# Patient Record
Sex: FEMALE | Race: OTHER | HISPANIC OR LATINO | URBAN - METROPOLITAN AREA
[De-identification: names, ages, dates, MRNs, and addresses within clinical notes are randomized per-mention and may not be internally consistent; named-entity substitution may affect disease eponyms.]

---

## 2019-01-10 ENCOUNTER — OUTPATIENT (OUTPATIENT)
Dept: OUTPATIENT SERVICES | Facility: HOSPITAL | Age: 24
LOS: 1 days | End: 2019-01-10
Payer: MEDICAID

## 2019-01-10 VITALS
TEMPERATURE: 98 F | RESPIRATION RATE: 18 BRPM | OXYGEN SATURATION: 100 % | SYSTOLIC BLOOD PRESSURE: 114 MMHG | HEIGHT: 66 IN | DIASTOLIC BLOOD PRESSURE: 72 MMHG | WEIGHT: 164.91 LBS | HEART RATE: 70 BPM

## 2019-01-10 DIAGNOSIS — N62 HYPERTROPHY OF BREAST: ICD-10-CM

## 2019-01-10 DIAGNOSIS — M53.82 OTHER SPECIFIED DORSOPATHIES, CERVICAL REGION: ICD-10-CM

## 2019-01-10 DIAGNOSIS — M54.89 OTHER DORSALGIA: ICD-10-CM

## 2019-01-10 DIAGNOSIS — Z01.818 ENCOUNTER FOR OTHER PREPROCEDURAL EXAMINATION: ICD-10-CM

## 2019-01-10 DIAGNOSIS — L30.4 ERYTHEMA INTERTRIGO: ICD-10-CM

## 2019-01-10 LAB
ANION GAP SERPL CALC-SCNC: 11 MMOL/L — SIGNIFICANT CHANGE UP (ref 5–17)
APTT BLD: 29.5 SEC — SIGNIFICANT CHANGE UP (ref 27.5–36.3)
BUN SERPL-MCNC: 11 MG/DL — SIGNIFICANT CHANGE UP (ref 7–18)
CALCIUM SERPL-MCNC: 8.5 MG/DL — SIGNIFICANT CHANGE UP (ref 8.4–10.5)
CHLORIDE SERPL-SCNC: 102 MMOL/L — SIGNIFICANT CHANGE UP (ref 96–108)
CO2 SERPL-SCNC: 27 MMOL/L — SIGNIFICANT CHANGE UP (ref 22–31)
CREAT SERPL-MCNC: 0.6 MG/DL — SIGNIFICANT CHANGE UP (ref 0.5–1.3)
GLUCOSE SERPL-MCNC: 84 MG/DL — SIGNIFICANT CHANGE UP (ref 70–99)
HCT VFR BLD CALC: 38.3 % — SIGNIFICANT CHANGE UP (ref 34.5–45)
HGB BLD-MCNC: 12.6 G/DL — SIGNIFICANT CHANGE UP (ref 11.5–15.5)
INR BLD: 1.02 RATIO — SIGNIFICANT CHANGE UP (ref 0.88–1.16)
MCHC RBC-ENTMCNC: 30.4 PG — SIGNIFICANT CHANGE UP (ref 27–34)
MCHC RBC-ENTMCNC: 33.1 GM/DL — SIGNIFICANT CHANGE UP (ref 32–36)
MCV RBC AUTO: 92 FL — SIGNIFICANT CHANGE UP (ref 80–100)
PLATELET # BLD AUTO: 282 K/UL — SIGNIFICANT CHANGE UP (ref 150–400)
POTASSIUM SERPL-MCNC: 3.8 MMOL/L — SIGNIFICANT CHANGE UP (ref 3.5–5.3)
POTASSIUM SERPL-SCNC: 3.8 MMOL/L — SIGNIFICANT CHANGE UP (ref 3.5–5.3)
PROTHROM AB SERPL-ACNC: 11.3 SEC — SIGNIFICANT CHANGE UP (ref 10–12.9)
RBC # BLD: 4.16 M/UL — SIGNIFICANT CHANGE UP (ref 3.8–5.2)
RBC # FLD: 12 % — SIGNIFICANT CHANGE UP (ref 10.3–14.5)
SODIUM SERPL-SCNC: 140 MMOL/L — SIGNIFICANT CHANGE UP (ref 135–145)
WBC # BLD: 9.7 K/UL — SIGNIFICANT CHANGE UP (ref 3.8–10.5)
WBC # FLD AUTO: 9.7 K/UL — SIGNIFICANT CHANGE UP (ref 3.8–10.5)

## 2019-01-10 PROCEDURE — 80048 BASIC METABOLIC PNL TOTAL CA: CPT

## 2019-01-10 PROCEDURE — 85610 PROTHROMBIN TIME: CPT

## 2019-01-10 PROCEDURE — 36415 COLL VENOUS BLD VENIPUNCTURE: CPT

## 2019-01-10 PROCEDURE — 85730 THROMBOPLASTIN TIME PARTIAL: CPT

## 2019-01-10 PROCEDURE — 85027 COMPLETE CBC AUTOMATED: CPT

## 2019-01-10 PROCEDURE — G0463: CPT

## 2019-01-10 RX ORDER — SODIUM CHLORIDE 9 MG/ML
3 INJECTION INTRAMUSCULAR; INTRAVENOUS; SUBCUTANEOUS EVERY 8 HOURS
Qty: 0 | Refills: 0 | Status: DISCONTINUED | OUTPATIENT
Start: 2019-01-22 | End: 2019-01-30

## 2019-01-10 NOTE — H&P PST ADULT - HISTORY OF PRESENT ILLNESS
23year old female with pmhx of migraine headaches presents with c/o back pain related to hypertrophic breast. Patient takes Ibuprofen PRN for pain with mild relieve. Patient is here today for presurgical testing for scheduled on Bilateral reduction mammoplasty on 1/22/19 23year old female with pmhx of migraine headaches presents with c/o back pain related to hypertrophic breast. Patient takes Ibuprofen PRN for pain with mild relieve. Patient is here today for presurgical testing for scheduled on Bilateral reduction mammoplasty on 1/22/19. 5 feet 5 inches tall, 34DDD bra.

## 2019-01-10 NOTE — H&P PST ADULT - BREASTS COMMENTS
Erythema Intertigo/ Hypertrophy of breast/Dorsalgia/ Cervical dorsapathies Erythema Intertigo/ Hypertrophy of breast/Dorsalgia/ Cervical dorsapathies. No masses in either breast or axilla.  Nipple heights 28 cm on the right and 29.5 cm on the left.  Nipple to inframammary crease distances 14.5 cm on the right and 15 cm on the left.

## 2019-01-22 ENCOUNTER — OUTPATIENT (OUTPATIENT)
Dept: OUTPATIENT SERVICES | Facility: HOSPITAL | Age: 24
LOS: 1 days | End: 2019-01-22
Payer: MEDICAID

## 2019-01-22 VITALS
HEART RATE: 102 BPM | SYSTOLIC BLOOD PRESSURE: 123 MMHG | OXYGEN SATURATION: 99 % | DIASTOLIC BLOOD PRESSURE: 72 MMHG | RESPIRATION RATE: 18 BRPM

## 2019-01-22 VITALS
DIASTOLIC BLOOD PRESSURE: 74 MMHG | HEIGHT: 65 IN | HEART RATE: 90 BPM | RESPIRATION RATE: 16 BRPM | WEIGHT: 164.91 LBS | OXYGEN SATURATION: 97 % | SYSTOLIC BLOOD PRESSURE: 109 MMHG | TEMPERATURE: 98 F

## 2019-01-22 DIAGNOSIS — Z01.818 ENCOUNTER FOR OTHER PREPROCEDURAL EXAMINATION: ICD-10-CM

## 2019-01-22 DIAGNOSIS — N62 HYPERTROPHY OF BREAST: ICD-10-CM

## 2019-01-22 DIAGNOSIS — L30.4 ERYTHEMA INTERTRIGO: ICD-10-CM

## 2019-01-22 DIAGNOSIS — M53.82 OTHER SPECIFIED DORSOPATHIES, CERVICAL REGION: ICD-10-CM

## 2019-01-22 DIAGNOSIS — M54.89 OTHER DORSALGIA: ICD-10-CM

## 2019-01-22 LAB — HCG UR QL: NEGATIVE — SIGNIFICANT CHANGE UP

## 2019-01-22 PROCEDURE — 81025 URINE PREGNANCY TEST: CPT

## 2019-01-22 PROCEDURE — 19318 BREAST REDUCTION: CPT | Mod: 50

## 2019-01-22 RX ORDER — HYDROMORPHONE HYDROCHLORIDE 2 MG/ML
0.5 INJECTION INTRAMUSCULAR; INTRAVENOUS; SUBCUTANEOUS
Qty: 0 | Refills: 0 | Status: DISCONTINUED | OUTPATIENT
Start: 2019-01-22 | End: 2019-01-23

## 2019-01-22 RX ORDER — ACETAMINOPHEN 500 MG
1000 TABLET ORAL ONCE
Qty: 0 | Refills: 0 | Status: DISCONTINUED | OUTPATIENT
Start: 2019-01-22 | End: 2019-01-23

## 2019-01-22 RX ORDER — ONDANSETRON 8 MG/1
4 TABLET, FILM COATED ORAL ONCE
Qty: 0 | Refills: 0 | Status: COMPLETED | OUTPATIENT
Start: 2019-01-22 | End: 2019-01-22

## 2019-01-22 RX ORDER — OXYCODONE AND ACETAMINOPHEN 5; 325 MG/1; MG/1
1 TABLET ORAL EVERY 4 HOURS
Qty: 0 | Refills: 0 | Status: DISCONTINUED | OUTPATIENT
Start: 2019-01-22 | End: 2019-01-22

## 2019-01-22 RX ORDER — SODIUM CHLORIDE 9 MG/ML
1000 INJECTION, SOLUTION INTRAVENOUS
Qty: 0 | Refills: 0 | Status: DISCONTINUED | OUTPATIENT
Start: 2019-01-22 | End: 2019-01-23

## 2019-01-22 RX ADMIN — SODIUM CHLORIDE 3 MILLILITER(S): 9 INJECTION INTRAMUSCULAR; INTRAVENOUS; SUBCUTANEOUS at 06:54

## 2019-01-22 RX ADMIN — SODIUM CHLORIDE 75 MILLILITER(S): 9 INJECTION, SOLUTION INTRAVENOUS at 16:47

## 2019-01-22 RX ADMIN — ONDANSETRON 4 MILLIGRAM(S): 8 TABLET, FILM COATED ORAL at 17:23

## 2019-01-22 NOTE — BRIEF OPERATIVE NOTE - PROCEDURE
<<-----Click on this checkbox to enter Procedure Mammoplasty of both breasts  01/22/2019    Active  [facility]

## 2019-01-22 NOTE — ASU PREOP CHECKLIST - PATIENT'S PERSONAL PROPERTY REMOVED
body piercing/glasses/jewelry/left nose ring tapped---"not removable" glasses/jewelry/body piercing/left nose ring tapped---"not removable"--OR RN Anisha informed.

## 2019-01-22 NOTE — ASU DISCHARGE PLAN (ADULT/PEDIATRIC). - NOTIFY
Fever greater than 101/Inability to Tolerate Liquids or Foods/Pain not relieved by Medications/Numbness, color, or temperature change to extremity/Bleeding that does not stop/Excessive Diarrhea/Persistent Nausea and Vomiting/Numbness, tingling/Increased Irritability or Sluggishness/Swelling that continues/Unable to Urinate

## 2019-01-25 LAB — SURGICAL PATHOLOGY STUDY: SIGNIFICANT CHANGE UP

## 2019-01-25 NOTE — ASU PATIENT PROFILE, ADULT - NS PRO PASSIVE SMOKE EXP
Oxycodone       Last Written Prescription Date:  1/14/2019  Last Fill Quantity: 30,   # refills: 0  Last Office Visit: 10/19/2018  Future Office visit:       Routing refill request to provider for review/approval because:  Drug not on the [facility], [facility] or [facility] refill protocol or controlled substance    
Signed original Rx faxed to [facility] and put in bin at  to be picked up by Pharmacy. Shakira,       
No

## 2021-05-06 NOTE — ASU PREOP CHECKLIST - ASSESSMENT, HISTORY & PHYSICAL COMPLETED AND ON MEDICAL RECORD
CALLED AND LEFT A VOICEMAIL FOR PATIENT TO GIVE ME A CALL BACK TO CLARIFY. NOT SURE IF SHE IS WANTING US TO ORDER AN MRI AT THIS TIME. TRIED EXPLAINING IN VOICEMAIL THAT MOST INSURANCES WILL WANT UPDATED OFFICE NOTES FROM DR. VASQUEZ IF SIGNS/SYMPTOMS HAVE NOT IMPROVED WITH CONSERVATIVE CARE TO GET AN MRI AUTHORIZED. NOTED SHE HAD AN APPOINTMENT TODAY THAT SHE CANCELLED. ASKED FOR A PHONE CALL BACK CLARIFYING IF WE COULD STILL SEE HER BACK TODAY OR POSSIBLY Monday IN [place]. GAVE HER MY DIRECT NUMBER TO LEAVE ME A VOICEMAIL, [phone], SO WE CAN MOVE FORWARD.
General Question     Subject: Patient advised the pain is not radiating up her arm. Would like to know is an mri can be ordered?   Patient Tessy Warren  Contact Number: [phone]
done

## 2021-10-20 NOTE — ASU PREOP CHECKLIST - DNR CLARIFICATION FORM COMPLETED
I reviewed the H&P, I examined the patient, and there are no changes in the patient's condition.    
n/a

## 2023-06-09 NOTE — H&P PST ADULT - ABILITY TO HEAR (WITH HEARING AID OR HEARING APPLIANCE IF NORMALLY USED):
Problem: Discharge Planning  Goal: Discharge to home or other facility with appropriate resources  6/9/2023 1443 by Belkys Rees RN  Outcome: Completed  6/9/2023 0352 by Danelle Castle RN  Outcome: Progressing     Problem: Gastrointestinal - Adult  Goal: Minimal or absence of nausea and vomiting  6/9/2023 1443 by Belkys Rees RN  Outcome: Completed  6/9/2023 0352 by Danelle Castle RN  Outcome: Progressing  Goal: Maintains adequate nutritional intake  6/9/2023 1443 by Belkys Rees RN  Outcome: Completed  6/9/2023 0352 by Danelle Castle RN  Outcome: Progressing Adequate: hears normal conversation without difficulty